# Patient Record
Sex: MALE | Race: WHITE | NOT HISPANIC OR LATINO | Employment: OTHER | URBAN - NONMETROPOLITAN AREA
[De-identification: names, ages, dates, MRNs, and addresses within clinical notes are randomized per-mention and may not be internally consistent; named-entity substitution may affect disease eponyms.]

---

## 2022-06-07 ENCOUNTER — APPOINTMENT (OUTPATIENT)
Dept: GENERAL RADIOLOGY | Facility: OTHER | Age: 67
End: 2022-06-07
Attending: PHYSICIAN ASSISTANT
Payer: MEDICARE

## 2022-06-07 ENCOUNTER — HOSPITAL ENCOUNTER (EMERGENCY)
Facility: OTHER | Age: 67
Discharge: HOME | End: 2022-06-07
Attending: PHYSICIAN ASSISTANT | Admitting: PHYSICIAN ASSISTANT
Payer: MEDICARE

## 2022-06-07 VITALS
BODY MASS INDEX: 37.93 KG/M2 | RESPIRATION RATE: 16 BRPM | HEIGHT: 72 IN | WEIGHT: 280 LBS | OXYGEN SATURATION: 95 % | HEART RATE: 63 BPM | TEMPERATURE: 98.2 F | SYSTOLIC BLOOD PRESSURE: 146 MMHG | DIASTOLIC BLOOD PRESSURE: 87 MMHG

## 2022-06-07 DIAGNOSIS — M48.062 LUMBAR STENOSIS WITH NEUROGENIC CLAUDICATION: ICD-10-CM

## 2022-06-07 DIAGNOSIS — M51.369 DDD (DEGENERATIVE DISC DISEASE), LUMBAR: ICD-10-CM

## 2022-06-07 DIAGNOSIS — Z91.148 CONTROLLED SUBSTANCE AGREEMENT TERMINATED: ICD-10-CM

## 2022-06-07 DIAGNOSIS — M54.50 LUMBAR BACK PAIN: ICD-10-CM

## 2022-06-07 PROCEDURE — 96372 THER/PROPH/DIAG INJ SC/IM: CPT | Performed by: PHYSICIAN ASSISTANT

## 2022-06-07 PROCEDURE — 99284 EMERGENCY DEPT VISIT MOD MDM: CPT | Mod: 25 | Performed by: PHYSICIAN ASSISTANT

## 2022-06-07 PROCEDURE — 72100 X-RAY EXAM L-S SPINE 2/3 VWS: CPT

## 2022-06-07 PROCEDURE — 250N000013 HC RX MED GY IP 250 OP 250 PS 637: Performed by: PHYSICIAN ASSISTANT

## 2022-06-07 PROCEDURE — 250N000011 HC RX IP 250 OP 636: Performed by: PHYSICIAN ASSISTANT

## 2022-06-07 PROCEDURE — 99284 EMERGENCY DEPT VISIT MOD MDM: CPT | Performed by: PHYSICIAN ASSISTANT

## 2022-06-07 PROCEDURE — 250N000012 HC RX MED GY IP 250 OP 636 PS 637: Performed by: PHYSICIAN ASSISTANT

## 2022-06-07 RX ORDER — LIDOCAINE 50 MG/G
2 PATCH TOPICAL EVERY 24 HOURS
Qty: 20 PATCH | Refills: 0 | Status: SHIPPED | OUTPATIENT
Start: 2022-06-07 | End: 2022-06-17

## 2022-06-07 RX ORDER — LIDOCAINE 50 MG/G
2 PATCH TOPICAL ONCE
Status: DISCONTINUED | OUTPATIENT
Start: 2022-06-07 | End: 2022-06-07 | Stop reason: HOSPADM

## 2022-06-07 RX ORDER — HYDROCODONE BITARTRATE AND ACETAMINOPHEN 5; 325 MG/1; MG/1
1 TABLET ORAL EVERY 6 HOURS PRN
Qty: 12 TABLET | Refills: 0 | Status: SHIPPED | OUTPATIENT
Start: 2022-06-07 | End: 2022-06-18

## 2022-06-07 RX ORDER — BUDESONIDE AND FORMOTEROL FUMARATE DIHYDRATE 80; 4.5 UG/1; UG/1
2 AEROSOL RESPIRATORY (INHALATION)
COMMUNITY
Start: 2021-03-18

## 2022-06-07 RX ORDER — PREDNISONE 20 MG/1
40 TABLET ORAL ONCE
Status: COMPLETED | OUTPATIENT
Start: 2022-06-07 | End: 2022-06-07

## 2022-06-07 RX ORDER — FENTANYL CITRATE 50 UG/ML
100 INJECTION, SOLUTION INTRAMUSCULAR; INTRAVENOUS ONCE
Status: COMPLETED | OUTPATIENT
Start: 2022-06-07 | End: 2022-06-07

## 2022-06-07 RX ORDER — IBUPROFEN 800 MG/1
800 TABLET, FILM COATED ORAL EVERY 8 HOURS PRN
Qty: 60 TABLET | Refills: 0 | Status: SHIPPED | OUTPATIENT
Start: 2022-06-07 | End: 2022-06-15

## 2022-06-07 RX ORDER — ATORVASTATIN CALCIUM 40 MG/1
40 TABLET, FILM COATED ORAL DAILY
COMMUNITY

## 2022-06-07 RX ORDER — KETOROLAC TROMETHAMINE 30 MG/ML
30 INJECTION, SOLUTION INTRAMUSCULAR; INTRAVENOUS ONCE
Status: COMPLETED | OUTPATIENT
Start: 2022-06-07 | End: 2022-06-07

## 2022-06-07 RX ORDER — METOPROLOL TARTRATE 25 MG/1
25 TABLET, FILM COATED ORAL DAILY
COMMUNITY

## 2022-06-07 RX ORDER — PREDNISONE 20 MG/1
TABLET ORAL
Qty: 15 TABLET | Refills: 0 | Status: SHIPPED | OUTPATIENT
Start: 2022-06-07

## 2022-06-07 RX ORDER — ALBUTEROL SULFATE 90 UG/1
AEROSOL, METERED RESPIRATORY (INHALATION)
COMMUNITY
Start: 2021-03-16

## 2022-06-07 RX ORDER — INSULIN GLARGINE 100 [IU]/ML
24 INJECTION, SOLUTION SUBCUTANEOUS
COMMUNITY
Start: 2022-03-03 | End: 2022-06-07

## 2022-06-07 RX ORDER — INSULIN GLARGINE 100 [IU]/ML
26 INJECTION, SOLUTION SUBCUTANEOUS 2 TIMES DAILY
COMMUNITY

## 2022-06-07 RX ADMIN — FENTANYL CITRATE 100 MCG: 50 INJECTION, SOLUTION INTRAMUSCULAR; INTRAVENOUS at 11:05

## 2022-06-07 RX ADMIN — PREDNISONE 40 MG: 20 TABLET ORAL at 11:07

## 2022-06-07 RX ADMIN — LIDOCAINE 5% 2 PATCH: 700 PATCH TOPICAL at 12:32

## 2022-06-07 RX ADMIN — KETOROLAC TROMETHAMINE 30 MG: 30 INJECTION, SOLUTION INTRAMUSCULAR at 11:05

## 2022-06-07 ASSESSMENT — ENCOUNTER SYMPTOMS
STRIDOR: 0
CONFUSION: 0
SEIZURES: 0
FEVER: 0
EYE PAIN: 0
FACIAL ASYMMETRY: 0
FACIAL SWELLING: 0
FLANK PAIN: 0
TREMORS: 0
AGITATION: 0
BACK PAIN: 1
ABDOMINAL PAIN: 0

## 2022-06-07 NOTE — ED PROVIDER NOTES
History     Chief Complaint   Patient presents with     Back Pain     HPI  Ayo Gallardo is a 66 year old male who recently moved to this area from [place].  He has a history of some chronic lumbar pain-  -he reports they are there is a history of for compression fractures in the past as well has 2 herniated disks.  He took some ibuprofen today with no relief.  Reports he has not had anything for pain other than hydrocodone which she believes a couple months ago.  Denies any numbness or tingling.  No lightheadedness or dizziness.  No chest pain or shortness of breath.  Past medical history is significant for anxiety, bilateral hip pain, bilateral shoulder pain, chronic neck pain, chronic substance agreement terminated, diabetes type 2 uncontrolled, hyperlipidemia, insomnia, lumbar stenosis with neurogenic claudication, major depressive disorder, diabetic neuropathy, PTSD, vitamin D deficiency and tobacco use disorder.    Allergies:  Allergies   Allergen Reactions     Amitriptyline      Nightmares, jittery     Cyclobenzaprine Other (See Comments)     Hearing sounds, made him feel strange.     Metformin Diarrhea     Morphine Rash       Problem List:    There are no problems to display for this patient.       Past Medical History:    No past medical history on file.    Past Surgical History:    No past surgical history on file.    Family History:    No family history on file.    Social History:  Marital Status:  Single [1]        Medications:    atorvastatin (LIPITOR) 40 MG tablet  insulin glargine (LANTUS VIAL) 100 UNIT/ML vial  metoprolol tartrate (LOPRESSOR) 25 MG tablet          Review of Systems   Constitutional: Negative for fever.   HENT: Negative for drooling and facial swelling.    Eyes: Negative for pain and visual disturbance.   Respiratory: Negative for stridor.    Cardiovascular: Negative for chest pain.   Gastrointestinal: Negative for abdominal pain.   Genitourinary: Negative for flank pain.    Musculoskeletal: Positive for back pain.   Skin: Negative for pallor.   Neurological: Negative for tremors, seizures and facial asymmetry.   Psychiatric/Behavioral: Negative for agitation and confusion.   All other systems reviewed and are negative.      Physical Exam   BP: (!) 223/105  Pulse: 86  Temp: 98.2  F (36.8  C)  Resp: 16  Height: 182.9 cm (6')  Weight: 127 kg (280 lb)  SpO2: 95 %    Vitals:    06/07/22 0944 06/07/22 1200   BP: (!) 223/105 (!) 146/87   Pulse: 86 63   Resp: 16    Temp: 98.2  F (36.8  C)    TempSrc: Temporal    SpO2: 95%    Weight: 127 kg (280 lb)    Height: 1.829 m (6')        Physical Exam  Vitals and nursing note reviewed.   Constitutional:       General: He is not in acute distress.     Appearance: Normal appearance. He is not ill-appearing or toxic-appearing.   HENT:      Head: Normocephalic. No raccoon eyes, right periorbital erythema or left periorbital erythema.      Right Ear: No drainage or tenderness.      Left Ear: No drainage or tenderness.      Nose: Nose normal.   Eyes:      General: Lids are normal. Gaze aligned appropriately. No scleral icterus.     Extraocular Movements: Extraocular movements intact.   Neck:      Trachea: No tracheal deviation.   Cardiovascular:      Rate and Rhythm: Normal rate.   Pulmonary:      Effort: Pulmonary effort is normal. No respiratory distress.      Breath sounds: No stridor.   Abdominal:      Tenderness: There is no abdominal tenderness.   Musculoskeletal:         General: No deformity or signs of injury. Normal range of motion.      Cervical back: Normal range of motion. No signs of trauma.      Comments: Bilateral lumbar tenderness.   Skin:     General: Skin is warm and dry.      Coloration: Skin is not jaundiced or pale.   Neurological:      General: No focal deficit present.      Mental Status: He is alert and oriented to person, place, and time.      GCS: GCS eye subscore is 4. GCS verbal subscore is 5. GCS motor subscore is 6.       Motor: No tremor or seizure activity.   Psychiatric:         Attention and Perception: Attention normal.         Mood and Affect: Mood normal.         ED Course         Results for orders placed or performed during the hospital encounter of 06/07/22 (from the past 24 hour(s))   XR Lumbar Spine 2/3 Views    Narrative    PROCEDURE: XR LUMBAR SPINE 2-3 VIEWS 6/7/2022 11:29 AM    HISTORY: acute on chronic back pain    COMPARISONS: None.    TECHNIQUE: AP and lateral with coned-down view    FINDINGS: Moderate scoliosis convex to the right. Moderate multilevel  disc disease throughout the lumbar region. Mild compression deformity  of L1, most likely old. Clinical correlation recommended.    Associated facet arthrosis is only mild narrowing of the neural  foramen at L4-5 and L5-S1.         Impression    IMPRESSION:   1. Moderate scoliosis of moderate multilevel degenerative disc  disease.  2. Mild contusion present deformity of L2, most likely old but  correlation recommended.  3. Associated facet arthrosis with mild narrowing of the neuroforamen  at L4-5 and L5-S1.    TAWANA RODRÍGUEZ MD         SYSTEM ID:  [id number]       Medications   lidocaine (LIDODERM) 5 % Patch 2 patch (2 patches Transdermal Patch/Med Applied 6/7/22 1232)   lidocaine patch in PLACE (has no administration in time range)   fentaNYL (PF) (SUBLIMAZE) injection 100 mcg (100 mcg Intramuscular Given 6/7/22 1105)   ketorolac (TORADOL) injection 30 mg (30 mg Intramuscular Given 6/7/22 1105)   predniSONE (DELTASONE) tablet 40 mg (40 mg Oral Given 6/7/22 1107)       Assessments & Plan (with Medical Decision Making)     I have reviewed the nursing notes.    I have reviewed the findings, diagnosis, plan and need for follow up with the patient.      Discharge Medication List as of 6/7/2022 12:42 PM      START taking these medications    Details   HYDROcodone-acetaminophen (NORCO) 5-325 MG tablet Take 1 tablet by mouth every 6 hours as needed for moderate to  severe pain, Disp-12 tablet, R-0, Local Print      ibuprofen (ADVIL/MOTRIN) 800 MG tablet Take 1 tablet (800 mg) by mouth every 8 hours as needed for moderate pain, Disp-60 tablet, R-0, Local Print      lidocaine (LIDODERM) 5 % patch Place 2 patches onto the skin every 24 hours for 10 daysDisp-20 patch, R-0Local Print      predniSONE (DELTASONE) 20 MG tablet Take two tablets (= 40mg) each day for 5 (five) days, then one tablet (20 mg) each day x 5 days until finished, Disp-15 tablet, R-0, Local Print             Final diagnoses:   Lumbar back pain   DDD (degenerative disc disease), lumbar   Lumbar stenosis with neurogenic claudication   Controlled substance agreement terminated     Afebrile.  Vital signs stable.  Patient with acute on chronic lumbar back pain.  Denies any new injuries.  He recently moved here from [place] and needs to establish care for pain management.  Patient has a history of a pain contract being broken in the past.  The patient was given fentanyl IM, Toradol, prednisone and a Lidoderm patch initially.  Lumbar x-rays show Moderate scoliosis of moderate multilevel degenerative disc disease.. Mild contusion present deformity of L2, most likely old but correlation recommended.   Associated facet arthrosis with mild narrowing of the neuroforamen at L4-5 and L5-S1.   He reports feeling much better with the above treatment and rates his pain now is maybe a 4/10.   [facility] reviewed and he did have a prescription for hydrocodone 5/325 12 tablets the beginning of May.  I discussed with the patient the importance and need of close follow-up with your primary care and this will be arranged to establish care.  Rx for short course of ibuprofen, Lidoderm patches and prednisone taper.  Rx for hydrocodone 5/325 #12.   Follow-up arranged with Dr. Sutton to establish care and for continued pain management on 6/10/2022.  He should have adequate pain control to last until that visit.  Follow-up sooner if  there is any other concerns problems or questions.  I explained my diagnostic considerations and recommendations and the patient voiced an understanding and was in agreement with the treatment plan. All questions were answered to the best of my ability.  We discussed potential side effects of any prescribed or recommended therapies, as well as expectations for response to treatments.    6/7/2022   [facility]     [facility] [place] PA-C  06/07/22 1807

## 2022-06-07 NOTE — ED TRIAGE NOTES
Pt here with a friend, pt reports that he has a hx of 4 compression fractures, 2 herniated disks, pt reports severe pain to low back, pt reports that he took some ibuprofen today with no relief, pt states that he hasn't had any hydrocodone in 2 months, BP is elevated, but no acute distress, pt out into waiting room to wait for ED room     Triage Assessment     Row Name 06/07/22 0941       Triage Assessment (Adult)    Airway WDL WDL       Respiratory WDL    Respiratory WDL WDL       Skin Circulation/Temperature WDL    Skin Circulation/Temperature WDL WDL       Peripheral/Neurovascular WDL    Peripheral Neurovascular WDL WDL       Cognitive/Neuro/Behavioral WDL    Cognitive/Neuro/Behavioral WDL WDL

## 2022-06-10 ENCOUNTER — TELEPHONE (OUTPATIENT)
Dept: SURGERY | Facility: OTHER | Age: 67
End: 2022-06-10

## 2022-06-10 ENCOUNTER — OFFICE VISIT (OUTPATIENT)
Dept: INTERNAL MEDICINE | Facility: OTHER | Age: 67
End: 2022-06-10
Attending: STUDENT IN AN ORGANIZED HEALTH CARE EDUCATION/TRAINING PROGRAM
Payer: MEDICARE

## 2022-06-10 VITALS
TEMPERATURE: 97.4 F | RESPIRATION RATE: 16 BRPM | WEIGHT: 305.6 LBS | SYSTOLIC BLOOD PRESSURE: 138 MMHG | BODY MASS INDEX: 41.45 KG/M2 | HEART RATE: 68 BPM | OXYGEN SATURATION: 91 % | DIASTOLIC BLOOD PRESSURE: 80 MMHG

## 2022-06-10 DIAGNOSIS — Z13.220 LIPID SCREENING: ICD-10-CM

## 2022-06-10 DIAGNOSIS — E11.65 UNCONTROLLED TYPE 2 DIABETES MELLITUS WITH HYPERGLYCEMIA (H): Primary | ICD-10-CM

## 2022-06-10 DIAGNOSIS — Z11.59 NEED FOR HEPATITIS C SCREENING TEST: ICD-10-CM

## 2022-06-10 DIAGNOSIS — E66.01 MORBID OBESITY (H): ICD-10-CM

## 2022-06-10 DIAGNOSIS — K25.4 GASTROINTESTINAL HEMORRHAGE ASSOCIATED WITH GASTRIC ULCER: ICD-10-CM

## 2022-06-10 DIAGNOSIS — M54.50 LUMBAR BACK PAIN: ICD-10-CM

## 2022-06-10 LAB
ALBUMIN SERPL-MCNC: 4.2 G/DL (ref 3.5–5.7)
ALP SERPL-CCNC: 74 U/L (ref 34–104)
ALT SERPL W P-5'-P-CCNC: 15 U/L (ref 7–52)
ANION GAP SERPL CALCULATED.3IONS-SCNC: 8 MMOL/L (ref 3–14)
AST SERPL W P-5'-P-CCNC: 15 U/L (ref 13–39)
BILIRUB SERPL-MCNC: 0.5 MG/DL (ref 0.3–1)
BUN SERPL-MCNC: 22 MG/DL (ref 7–25)
CALCIUM SERPL-MCNC: 9.5 MG/DL (ref 8.6–10.3)
CHLORIDE BLD-SCNC: 103 MMOL/L (ref 98–107)
CHOLEST SERPL-MCNC: 161 MG/DL
CO2 SERPL-SCNC: 32 MMOL/L (ref 21–31)
CREAT SERPL-MCNC: 0.88 MG/DL (ref 0.7–1.3)
CREAT UR-MCNC: 100 MG/DL
ERYTHROCYTE [DISTWIDTH] IN BLOOD BY AUTOMATED COUNT: 14.1 % (ref 10–15)
FASTING STATUS PATIENT QL REPORTED: YES
GFR SERPL CREATININE-BSD FRML MDRD: >90 ML/MIN/1.73M2
GLUCOSE BLD-MCNC: 136 MG/DL (ref 70–105)
HBA1C MFR BLD: 8.5 % (ref 4–6.2)
HCT VFR BLD AUTO: 47.9 % (ref 40–53)
HDLC SERPL-MCNC: 38 MG/DL (ref 23–92)
HGB BLD-MCNC: 15.5 G/DL (ref 13.3–17.7)
LDLC SERPL CALC-MCNC: 101 MG/DL
MCH RBC QN AUTO: 31.2 PG (ref 26.5–33)
MCHC RBC AUTO-ENTMCNC: 32.4 G/DL (ref 31.5–36.5)
MCV RBC AUTO: 96 FL (ref 78–100)
MICROALBUMIN UR-MCNC: 86 MG/L
MICROALBUMIN/CREAT UR: 86 MG/G CR (ref 0–17)
NONHDLC SERPL-MCNC: 123 MG/DL
PLATELET # BLD AUTO: 243 10E3/UL (ref 150–450)
POTASSIUM BLD-SCNC: 4.1 MMOL/L (ref 3.5–5.1)
PROT SERPL-MCNC: 7.1 G/DL (ref 6.4–8.9)
RBC # BLD AUTO: 4.97 10E6/UL (ref 4.4–5.9)
SODIUM SERPL-SCNC: 143 MMOL/L (ref 134–144)
TRIGL SERPL-MCNC: 111 MG/DL
WBC # BLD AUTO: 5.6 10E3/UL (ref 4–11)

## 2022-06-10 PROCEDURE — 80061 LIPID PANEL: CPT | Mod: ZL | Performed by: STUDENT IN AN ORGANIZED HEALTH CARE EDUCATION/TRAINING PROGRAM

## 2022-06-10 PROCEDURE — G0463 HOSPITAL OUTPT CLINIC VISIT: HCPCS

## 2022-06-10 PROCEDURE — 86803 HEPATITIS C AB TEST: CPT | Mod: ZL | Performed by: STUDENT IN AN ORGANIZED HEALTH CARE EDUCATION/TRAINING PROGRAM

## 2022-06-10 PROCEDURE — 99215 OFFICE O/P EST HI 40 MIN: CPT | Performed by: STUDENT IN AN ORGANIZED HEALTH CARE EDUCATION/TRAINING PROGRAM

## 2022-06-10 PROCEDURE — 80053 COMPREHEN METABOLIC PANEL: CPT | Mod: ZL | Performed by: STUDENT IN AN ORGANIZED HEALTH CARE EDUCATION/TRAINING PROGRAM

## 2022-06-10 PROCEDURE — 83036 HEMOGLOBIN GLYCOSYLATED A1C: CPT | Mod: ZL | Performed by: STUDENT IN AN ORGANIZED HEALTH CARE EDUCATION/TRAINING PROGRAM

## 2022-06-10 PROCEDURE — 36415 COLL VENOUS BLD VENIPUNCTURE: CPT | Mod: ZL | Performed by: STUDENT IN AN ORGANIZED HEALTH CARE EDUCATION/TRAINING PROGRAM

## 2022-06-10 PROCEDURE — 85014 HEMATOCRIT: CPT | Mod: ZL | Performed by: STUDENT IN AN ORGANIZED HEALTH CARE EDUCATION/TRAINING PROGRAM

## 2022-06-10 PROCEDURE — 82043 UR ALBUMIN QUANTITATIVE: CPT | Mod: ZL | Performed by: STUDENT IN AN ORGANIZED HEALTH CARE EDUCATION/TRAINING PROGRAM

## 2022-06-10 RX ORDER — TRAZODONE HYDROCHLORIDE 100 MG/1
200 TABLET ORAL AT BEDTIME
COMMUNITY

## 2022-06-10 RX ORDER — PROCHLORPERAZINE 25 MG/1
1 SUPPOSITORY RECTAL ONCE
Qty: 1 EACH | Refills: 0 | Status: SHIPPED | OUTPATIENT
Start: 2022-06-10 | End: 2022-06-10

## 2022-06-10 RX ORDER — PROCHLORPERAZINE 25 MG/1
1 SUPPOSITORY RECTAL
Qty: 3 EACH | Refills: 5 | Status: SHIPPED | OUTPATIENT
Start: 2022-06-10

## 2022-06-10 RX ORDER — PROCHLORPERAZINE 25 MG/1
1 SUPPOSITORY RECTAL
Qty: 1 EACH | Refills: 1 | Status: SHIPPED | OUTPATIENT
Start: 2022-06-10

## 2022-06-10 RX ORDER — TIZANIDINE 2 MG/1
2 TABLET ORAL 2 TIMES DAILY PRN
Qty: 60 TABLET | Refills: 1 | Status: SHIPPED | OUTPATIENT
Start: 2022-06-10

## 2022-06-10 ASSESSMENT — ANXIETY QUESTIONNAIRES
7. FEELING AFRAID AS IF SOMETHING AWFUL MIGHT HAPPEN: NOT AT ALL
2. NOT BEING ABLE TO STOP OR CONTROL WORRYING: NOT AT ALL
4. TROUBLE RELAXING: SEVERAL DAYS
6. BECOMING EASILY ANNOYED OR IRRITABLE: NOT AT ALL
GAD7 TOTAL SCORE: 1
7. FEELING AFRAID AS IF SOMETHING AWFUL MIGHT HAPPEN: NOT AT ALL
GAD7 TOTAL SCORE: 1
8. IF YOU CHECKED OFF ANY PROBLEMS, HOW DIFFICULT HAVE THESE MADE IT FOR YOU TO DO YOUR WORK, TAKE CARE OF THINGS AT HOME, OR GET ALONG WITH OTHER PEOPLE?: SOMEWHAT DIFFICULT
1. FEELING NERVOUS, ANXIOUS, OR ON EDGE: NOT AT ALL
3. WORRYING TOO MUCH ABOUT DIFFERENT THINGS: NOT AT ALL
GAD7 TOTAL SCORE: 1
5. BEING SO RESTLESS THAT IT IS HARD TO SIT STILL: NOT AT ALL

## 2022-06-10 ASSESSMENT — PATIENT HEALTH QUESTIONNAIRE - PHQ9
SUM OF ALL RESPONSES TO PHQ QUESTIONS 1-9: 2
10. IF YOU CHECKED OFF ANY PROBLEMS, HOW DIFFICULT HAVE THESE PROBLEMS MADE IT FOR YOU TO DO YOUR WORK, TAKE CARE OF THINGS AT HOME, OR GET ALONG WITH OTHER PEOPLE: SOMEWHAT DIFFICULT
SUM OF ALL RESPONSES TO PHQ QUESTIONS 1-9: 2

## 2022-06-10 ASSESSMENT — PAIN SCALES - GENERAL: PAINLEVEL: MODERATE PAIN (4)

## 2022-06-10 ASSESSMENT — ENCOUNTER SYMPTOMS: BACK PAIN: 1

## 2022-06-10 NOTE — TELEPHONE ENCOUNTER
[facility] Diagnostic Referral    Patient has a referral for a EGD/Colonoscopy with a diagnosis of eval for ulcer.  .    Please advise. Needs 1-2 week per referral .     Thank you,Debbie Sheehan on 6/10/2022 at 2:09 PM

## 2022-06-10 NOTE — PROGRESS NOTES
Luis Daniel Rollins is a 66 year old who presents for the following health issues     Back Pain     History of Present Illness       Back Pain:  He presents for follow up of back pain. Patient's back pain is a chronic problem.  Location of back pain:  Right lower back, right hip and left hip  Description of back pain: sharp  Back pain spreads: right knee and left knee    Since patient first noticed back pain, pain is: rapidly worsening  Does back pain interfere with his job:  Yes      He eats 2-3 servings of fruits and vegetables daily.He consumes 0 sweetened beverage(s) daily.He exercises with enough effort to increase his heart rate 9 or less minutes per day.  He exercises with enough effort to increase his heart rate 3 or less days per week.   He is taking medications regularly.    Today's PHQ-9         PHQ-9 Total Score: 2    PHQ-9 Q9 Thoughts of better off dead/self-harm past 2 weeks :   Not at all    How difficult have these problems made it for you to do your work, take care of things at home, or get along with other people: Somewhat difficult  Today's RONIT-7 Score: 1         Just moved to [place] from [place].       On omeprazole for a bleeding ulcer. Still having stomach pain, discomfort with eating and loose stools.   Bleeding occasionally in stools. Has EGD scheduled in [place]. Would like it here instead.   Last colonoscopy a long time ago      Does not test his blood sugars.   On lantus 26 units BID.     History of chronic lumbar back pain.  Sounds like he has a history of allegedly compression fractures degenerative disc disease.  Has tried physical therapy and has an MRI in the past.  He was told he needs spine surgery.  He has gotten 16 different prescriptions for controlled substances for his back pain from multiple providers after reviewing .  We discussed that hydrocodone is not indicated for chronic back pain recommended that he utilize acetaminophen 1000 mg twice daily physical  therapy muscle relaxant topical creams and rubs in addition to lidocaine patches.  He is in agreement this plan.  He would like to pursue physical therapy MRI and seeing the spine surgeon.  Has had back injections in the past and was not helpful/had a reaction.        Back pain due to break   Est care      Review of Systems   Musculoskeletal: Positive for back pain.            Objective    /80 (BP Location: Right arm, Patient Position: Sitting, Cuff Size: Adult Regular)   Pulse 68   Temp 97.4  F (36.3  C) (Temporal)   Resp 16   Wt 138.6 kg (305 lb 9.6 oz)   SpO2 91%   BMI 41.45 kg/m    Body mass index is 41.45 kg/m .  Physical Exam   General: Pleasant 66-year-old man sitting clinic no acute distress  MSK: Foot exam with intact sensation and no wounds on his feet monofilament testing normal        Assessment & Plan       ICD-10-CM    1. Uncontrolled type 2 diabetes mellitus with hyperglycemia (H)  E11.65 Comprehensive Metabolic Panel     Hemoglobin A1c     Albumin Random Urine Quantitative with Creat Ratio     Comprehensive Metabolic Panel     Hemoglobin A1c     Albumin Random Urine Quantitative with Creat Ratio   2. Morbid obesity (H)  E66.01    3. Lumbar back pain  M54.50 Adult Gastro Ref - Procedure Only     tiZANidine (ZANAFLEX) 2 MG tablet     MR Lumbar Spine w/o Contrast     Spine Referral     Physical Therapy Referral   4. Gastrointestinal hemorrhage associated with gastric ulcer  K25.4 CBC W PLT No Diff     CBC W PLT No Diff   5. Lipid screening  Z13.220 Lipid Panel     Lipid Panel   6. Need for hepatitis C screening test  Z11.59 Hepatitis C Screen Reflex to HCV RNA Quant and Genotype     Hepatitis C Screen Reflex to HCV RNA Quant and Genotype     Uncontrolled type 2 diabetes: On insulin glargine 26 units twice daily does not check blood sugars more than infrequently.  Recheck hemoglobin A1c today obtain basic labs and start continuous glucose monitor as he is a Medicare patient with insulin  needs.  This will be better for his diabetes control.  Diabetic foot exam was normal though he does have a diagnosis of peripheral neuropathy.    Lumbar back pain: Significant lumbar back to disease with reported compression fractures and degenerative disc disease is tried physical therapy in the past has been persisting for months or years.  Was not prescribed additional narcotics as this is not indicated for chronic back pain.  Recommended that he utilize physical therapy acetaminophen 1000 mg 4 times daily, was given a muscle relaxant recommended to try topical creams and lidocaine as well.  Recommended utilizing limited NSAIDs and to cut down his NSAID use.  MRI ordered and will refer to spine therapy as well to see if surgery is warranted.    GI hemorrhage: Reportedly has a stomach ulcer and is on omeprazole with scheduled EGD in [place].  He would like it to be performed in [place] recommend that he continue his omeprazole and order placed for EGD and colonoscopy repeat his CBC as there is no baseline labs for him today.         Encouraged weight loss and regular exercise.     No follow-ups on file.    Damien Tanner MD  [facility]         Total time spent on this visit 61 minutes including precharting, review ED notes outside hospital notes  labs ordering CGM and medications as well as discussion and counseling with patient, physical exam and documentation.

## 2022-06-10 NOTE — NURSING NOTE
Chief Complaint   Patient presents with     Back Pain     ER follow up  6-7-22  [facility]   pain management   est care        Medication reconciliation completed.    FOOD SECURITY SCREENING QUESTIONS:    The next two questions are to help us understand your food security.  If you are feeling you need any assistance in this area, we have resources available to support you today.    Hunger Vital Signs:  Within the past 12 months we worried whether our food would run out before we got money to buy more. Never  Within the past 12 months the food we bought just didn't last and we didn't have money to get more. Never    Initial /80 (BP Location: Right arm, Patient Position: Sitting, Cuff Size: Adult Regular)   Pulse 68   Temp 97.4  F (36.3  C) (Temporal)   Resp 16   Wt 138.6 kg (305 lb 9.6 oz)   SpO2 91%   BMI 41.45 kg/m   Estimated body mass index is 41.45 kg/m  as calculated from the following:    Height as of 6/7/22: 1.829 m (6').    Weight as of this encounter: 138.6 kg (305 lb 9.6 oz).       Vidhya Borja LPN .......  6/10/2022  9:11 AM

## 2022-06-10 NOTE — LETTER
June 13, 2022      Ayo Gallardo  [address] [address]        Dear ,    We are writing to inform you of your test results.    Your lab results are as below.  Your blood counts are fine, your cholesterol is reasonable.  Your hemoglobin A1c is 8.5 and I think we can significantly improve this.  I am hopeful that the continuous glucose monitor is approved by her insurance so we can continue monitoring her blood sugars closer and make changes to your insulin.  I would like to talk with you about starting a blood pressure medicine to protect your kidneys with diabetes.  We can talk about this at your next appointment.    Resulted Orders   CBC W PLT No Diff   Result Value Ref Range    WBC Count 5.6 4.0 - 11.0 10e3/uL    RBC Count 4.97 4.40 - 5.90 10e6/uL    Hemoglobin 15.5 13.3 - 17.7 g/dL    Hematocrit 47.9 40.0 - 53.0 %    MCV 96 78 - 100 fL    MCH 31.2 26.5 - 33.0 pg    MCHC 32.4 31.5 - 36.5 g/dL    RDW 14.1 10.0 - 15.0 %    Platelet Count 243 150 - 450 10e3/uL   Lipid Panel   Result Value Ref Range    Cholesterol 161 <200 mg/dL    Triglycerides 111 <150 mg/dL    Direct Measure HDL 38 23 - 92 mg/dL    LDL Cholesterol Calculated 101 (H) <=100 mg/dL    Non HDL Cholesterol 123 <130 mg/dL    Patient Fasting > 8hrs? Yes     Narrative    Cholesterol  Desirable:  <200 mg/dL    Triglycerides  Normal:  Less than 150 mg/dL  Borderline High:  150-199 mg/dL  High:  200-499 mg/dL  Very High:  Greater than or equal to 500 mg/dL    Direct Measure HDL  Female:  Greater than or equal to 50 mg/dL   Male:  Greater than or equal to 40 mg/dL    LDL Cholesterol  Desirable:  <100mg/dL  Above Desirable:  100-129 mg/dL   Borderline High:  130-159 mg/dL   High:  160-189 mg/dL   Very High:  >= 190 mg/dL    Non HDL Cholesterol  Desirable:  130 mg/dL  Above Desirable:  130-159 mg/dL  Borderline High:  160-189 mg/dL  High:  190-219 mg/dL  Very High:  Greater than or equal to 220 mg/dL   Comprehensive Metabolic  Panel   Result Value Ref Range    Sodium 143 134 - 144 mmol/L    Potassium 4.1 3.5 - 5.1 mmol/L    Chloride 103 98 - 107 mmol/L    Carbon Dioxide (CO2) 32 (H) 21 - 31 mmol/L    Anion Gap 8 3 - 14 mmol/L    Urea Nitrogen 22 7 - 25 mg/dL    Creatinine 0.88 0.70 - 1.30 mg/dL    Calcium 9.5 8.6 - 10.3 mg/dL    Glucose 136 (H) 70 - 105 mg/dL    Alkaline Phosphatase 74 34 - 104 U/L    AST 15 13 - 39 U/L    ALT 15 7 - 52 U/L    Protein Total 7.1 6.4 - 8.9 g/dL    Albumin 4.2 3.5 - 5.7 g/dL    Bilirubin Total 0.5 0.3 - 1.0 mg/dL    GFR Estimate >90 >60 mL/min/1.73m2      Comment:      Effective December 21, 2021 eGFRcr in adults is calculated using the 2021 CKD-EPI creatinine equation which includes age and gender (Rachelle et al., NEJ, DOI: 10.1056/DQQXxc6179975)   Hemoglobin A1c   Result Value Ref Range    Hemoglobin A1C 8.5 (H) 4.0 - 6.2 %   Albumin Random Urine Quantitative with Creat Ratio   Result Value Ref Range    Creatinine Urine mg/dL 100 mg/dL    Albumin Urine mg/L 86 mg/L    Albumin Urine mg/g Cr 86.00 (H) 0.00 - 17.00 mg/g Cr       If you have any questions or concerns, please call the clinic at the number listed above.       Sincerely,      Damien Tanner MD

## 2022-06-13 LAB — HCV AB SERPL QL IA: NONREACTIVE

## 2022-06-18 ENCOUNTER — HOSPITAL ENCOUNTER (EMERGENCY)
Facility: OTHER | Age: 67
Discharge: HOME | End: 2022-06-18
Attending: EMERGENCY MEDICINE | Admitting: EMERGENCY MEDICINE
Payer: MEDICARE

## 2022-06-18 VITALS
TEMPERATURE: 96.9 F | RESPIRATION RATE: 20 BRPM | SYSTOLIC BLOOD PRESSURE: 148 MMHG | OXYGEN SATURATION: 97 % | HEART RATE: 68 BPM | DIASTOLIC BLOOD PRESSURE: 95 MMHG

## 2022-06-18 DIAGNOSIS — M54.41 ACUTE BILATERAL LOW BACK PAIN WITH RIGHT-SIDED SCIATICA: ICD-10-CM

## 2022-06-18 PROCEDURE — 99283 EMERGENCY DEPT VISIT LOW MDM: CPT | Performed by: EMERGENCY MEDICINE

## 2022-06-18 PROCEDURE — 99282 EMERGENCY DEPT VISIT SF MDM: CPT | Performed by: EMERGENCY MEDICINE

## 2022-06-18 NOTE — ED PROVIDER NOTES
Ayo Gallardo  : 1955 Age: 66 year old Sex: male MRN: [id number]    CC: LBP    HPI: Ayo Gallardo is a 66 year old male with PMH significant for chronic low back pain, chronic shoulder pain who presents with chronic low back pain.  He states he has had issues with this over the past 8 months, has had an MRI and is supposed to have surgery of his low back.  He denies any new complaints with this, no fevers, no new weakness, no loss of bowel or bladder function.  He is primarily here because he has run out of Norco that was given to him on his previous ER visit    ED Course and MDM:  Low back pain: Chronic in nature, it sounds as though he is done everything appropriately in terms of attempting to manage this nonsurgically and he may just end up needing surgical intervention.  We discussed that opioids are not a good pain management option for chronic low back pain.  He does have a referral into 1 spine surgeon in [place].  I sent a referral to [facility] as well in order to help accelerate his process of getting into see someone and potentially getting surgical correction.  I encouraged him to follow-up with his primary care for any ongoing pain complaints, it looks as though they are following this appropriately.  Discharged home in stable condition with return precautions    Final Clinical Impression:  Low back pain with radiculopathy    David Duke MD  Internal Medicine and Emergency Medicine  10:49 AM 22      Physical Exam:  BP (!) 148/95   Pulse 68   Temp 96.9  F (36.1  C)   Resp 20   SpO2 97%     5/5 str BLE plantar/dorsiflexion, flex/ex at knee, flex at hip    ROS:  Focused ROS performed and noted in HPI           David Duke MD  22 1101

## 2022-06-18 NOTE — ED TRIAGE NOTES
Patient is here for chronic lower back pain that started 8 months ago and worse now.  Was recently seen and given 12 pain pills.  Patient states he is out and his doctor will not give him more.  Patient rates pain at 7/10 and could not sleep last night.     Triage Assessment     Row Name 06/18/22 1003       Triage Assessment (Adult)    Airway WDL WDL       Respiratory WDL    Respiratory WDL WDL       Skin Circulation/Temperature WDL    Skin Circulation/Temperature WDL WDL       Cardiac WDL    Cardiac WDL WDL       Peripheral/Neurovascular WDL    Peripheral Neurovascular WDL WDL       Cognitive/Neuro/Behavioral WDL    Cognitive/Neuro/Behavioral WDL WDL

## 2022-06-21 ENCOUNTER — TELEPHONE (OUTPATIENT)
Dept: INTERNAL MEDICINE | Facility: OTHER | Age: 67
End: 2022-06-21

## 2022-06-21 NOTE — TELEPHONE ENCOUNTER
Hello!     Patients insurance requires a completed Certificate of Medical Necessity filled out prior to dispensing a Continuing Glucose Monitoring system. Below is the link to the Dexcom g6 [place] CMN form. If you could please fill out and sign the document. You can scan into the Media tab in Epic or fax the completed document to [phone].    Dexcom G6 form: - Medicare Part B  [email or web address]      Thank you!    Diabetes Care Services Team  [facility]  [address] [place] [address]

## 2022-06-23 NOTE — TELEPHONE ENCOUNTER
Can you print out this form and fill in the basics for patient, I will then complete it.     Damien Tanner MD on 6/23/2022 at 10:27 AM

## 2022-07-15 NOTE — TELEPHONE ENCOUNTER
Hello,     This is [facility] following up on our previous request for a CMN for the Dexcom.  We haven't received anything and just wanted to verify this was either being worked on still or if you had the correct fax number to send it to.  Our fax number is [phone].     If you have any questions regarding the CMN please call us at [phone].     Thank you,     [facility] - DCS Team

## 2022-07-21 NOTE — TELEPHONE ENCOUNTER
Returned phone call to [facility].  Informed them I just faxed the Medicare Dexcom Certificate of Medical Neccessity and they confirmed they received it and they will review now.    Vidhya Borja LPN .......  7/21/2022  11:18 AM

## (undated) RX ORDER — KETOROLAC TROMETHAMINE 30 MG/ML
INJECTION, SOLUTION INTRAMUSCULAR; INTRAVENOUS
Status: DISPENSED
Start: 2022-06-07

## (undated) RX ORDER — PREDNISONE 20 MG/1
TABLET ORAL
Status: DISPENSED
Start: 2022-06-07

## (undated) RX ORDER — FENTANYL CITRATE 50 UG/ML
INJECTION, SOLUTION INTRAMUSCULAR; INTRAVENOUS
Status: DISPENSED
Start: 2022-06-07

## (undated) RX ORDER — LIDOCAINE 50 MG/G
PATCH TOPICAL
Status: DISPENSED
Start: 2022-06-07